# Patient Record
Sex: MALE | Race: WHITE | NOT HISPANIC OR LATINO | Employment: UNEMPLOYED | URBAN - METROPOLITAN AREA
[De-identification: names, ages, dates, MRNs, and addresses within clinical notes are randomized per-mention and may not be internally consistent; named-entity substitution may affect disease eponyms.]

---

## 2017-06-27 ENCOUNTER — HOSPITAL ENCOUNTER (EMERGENCY)
Facility: HOSPITAL | Age: 52
Discharge: HOME | End: 2017-06-27
Attending: EMERGENCY MEDICINE
Payer: MEDICAID

## 2017-06-27 VITALS
DIASTOLIC BLOOD PRESSURE: 59 MMHG | OXYGEN SATURATION: 96 % | HEIGHT: 65 IN | WEIGHT: 168 LBS | RESPIRATION RATE: 16 BRPM | SYSTOLIC BLOOD PRESSURE: 119 MMHG | BODY MASS INDEX: 27.99 KG/M2 | TEMPERATURE: 98 F | HEART RATE: 88 BPM

## 2017-06-27 DIAGNOSIS — M70.21 OLECRANON BURSITIS OF RIGHT ELBOW: Primary | ICD-10-CM

## 2017-06-27 LAB
APPEARANCE FLD: NORMAL
BODY FLD TYPE: NORMAL
COLOR FLD: YELLOW
LYMPHOCYTES NFR FLD MANUAL: 2 %
MONOS+MACROS NFR FLD MANUAL: 8 %
NEUTROPHILS NFR FLD MANUAL: 90 %
WBC # FLD: 1247 /CU MM

## 2017-06-27 PROCEDURE — 99283 EMERGENCY DEPT VISIT LOW MDM: CPT | Mod: 25

## 2017-06-27 PROCEDURE — 87070 CULTURE OTHR SPECIMN AEROBIC: CPT

## 2017-06-27 PROCEDURE — 20605 DRAIN/INJ JOINT/BURSA W/O US: CPT | Mod: RT

## 2017-06-27 PROCEDURE — 89051 BODY FLUID CELL COUNT: CPT

## 2017-06-27 RX ORDER — LIDOCAINE HYDROCHLORIDE 10 MG/ML
10 INJECTION INFILTRATION; PERINEURAL
Status: DISCONTINUED | OUTPATIENT
Start: 2017-06-27 | End: 2017-06-27 | Stop reason: HOSPADM

## 2017-06-27 RX ORDER — LISINOPRIL 10 MG/1
10 TABLET ORAL DAILY
COMMUNITY

## 2017-06-27 NOTE — ED PROVIDER NOTES
Encounter Date: 6/27/2017       History     Chief Complaint   Patient presents with    Joint Swelling     x1.5 weeks.      Ronald Clements is a 51 y.o. Male presenting for evaluation of right elbow swelling, persisting for the last few weeks.  No direct injury, trauma or fall.  No fever, no chills.  He has noticed no drainage or bleeding from the elbow.  No numbness, tingling or weakness in the right arm.  He has taken Aspirin with minimal relief.  He has never had this problem before.  He was evaluated at his PCP and they recommended he come here to have the elbow drained.        The history is provided by the patient.     Review of patient's allergies indicates:  No Known Allergies  Past Medical History:   Diagnosis Date    Arthritis     Asthma     Concussion, unspecified     Head injury    COPD (chronic obstructive pulmonary disease)     Hyperlipidemia     Hypertension      Past Surgical History:   Procedure Laterality Date    APPENDECTOMY      HERNIA REPAIR      left forearm surgery       s/p work injury    TONSILLECTOMY       Family History   Problem Relation Age of Onset    Kidney failure Mother      on dialysis    Diabetes Mother      borderline    Arthritis Father      Social History   Substance Use Topics    Smoking status: Never Smoker    Smokeless tobacco: Never Used    Alcohol use No     Review of Systems   Constitutional: Negative for chills and fever.   Musculoskeletal: Positive for arthralgias, joint swelling and myalgias. Negative for back pain, neck pain and neck stiffness.   Skin: Negative for color change, pallor, rash and wound.   Neurological: Negative for weakness and numbness.   Hematological: Does not bruise/bleed easily.       Physical Exam     Initial Vitals [06/27/17 0924]   BP Pulse Resp Temp SpO2   (!) 119/59 88 16 97.9 °F (36.6 °C) 96 %      MAP       79         Physical Exam    Nursing note and vitals reviewed.  Constitutional: He appears well-developed and  well-nourished. He is not diaphoretic. No distress.   Cardiovascular: Intact distal pulses.   Musculoskeletal: Normal range of motion. He exhibits tenderness. He exhibits no edema.        Right elbow: He exhibits swelling and effusion. He exhibits normal range of motion, no deformity and no laceration. Tenderness found. Olecranon process tenderness noted.        Arms:  Moderate swelling and mild tenderness noted to right olecranon process.  No erythema.  Minimal warmth.  No decreased ROM, decreased strength or loss of sensation to RUE.  Palpable 2+ radial pulse.  No active bleeding or discharge noted from elbow.     Neurological: He is alert and oriented to person, place, and time. He has normal strength. No sensory deficit.   Skin: Skin is warm and dry. No rash and no abscess noted. No erythema.         ED Course   Arthrocentesis  Date/Time: 6/27/2017 3:20 PM  Performed by: VJ GONZALEZ  Authorized by: JONATHAN MATHUR   Indications: joint swelling   Body area: elbow  Joint: right elbow  Local anesthesia used: yes  Anesthesia: local infiltration    Anesthesia:  Local anesthesia used: yes  Local Anesthetic: lidocaine 1% without epinephrine  Anesthetic total: 1 mL  Patient sedated: no  Preparation: Patient was prepped and draped in the usual sterile fashion.  Needle size: 18 G  Approach: posterior  Aspirate amount: 4 mL  Aspirate: serous  Patient tolerance: Patient tolerated the procedure well with no immediate complications        Labs Reviewed   CULTURE, AEROBIC  (SPECIFY SOURCE)   WBC & DIFF,BODY FLUID             Medical Decision Making:   Differential Diagnosis:   Bursitis  Septic joint         APC / Resident Notes:   His symptoms are most consistent with olecranon bursitis.  Culture, WBCs and cell count will be sent, but low suspicion for infectious etiology.  He will be discharged home to follow-up with orthopedics for re-evaluation and further treatment options.  He voices understanding and is  agreeable to the plan.  He is given specific return precautions.               ED Course     Clinical Impression:   The encounter diagnosis was Olecranon bursitis of right elbow.                           Sheri Culver PA-C  06/27/17 1528

## 2017-06-30 LAB — BACTERIA SPEC AEROBE CULT: NO GROWTH

## 2019-07-20 ENCOUNTER — HOSPITAL ENCOUNTER (EMERGENCY)
Facility: HOSPITAL | Age: 54
Discharge: HOME | End: 2019-07-20
Attending: EMERGENCY MEDICINE
Payer: MEDICARE

## 2019-07-20 VITALS
OXYGEN SATURATION: 98 % | WEIGHT: 185 LBS | TEMPERATURE: 98 F | RESPIRATION RATE: 18 BRPM | SYSTOLIC BLOOD PRESSURE: 125 MMHG | BODY MASS INDEX: 29.73 KG/M2 | HEIGHT: 66 IN | HEART RATE: 82 BPM | DIASTOLIC BLOOD PRESSURE: 84 MMHG

## 2019-07-20 DIAGNOSIS — S39.012A STRAIN OF LUMBAR PARASPINOUS MUSCLE, INITIAL ENCOUNTER: Primary | ICD-10-CM

## 2019-07-20 LAB
BILIRUB UR QL STRIP: NEGATIVE
CLARITY UR: CLEAR
COLOR UR: YELLOW
GLUCOSE UR QL STRIP: NEGATIVE
HGB UR QL STRIP: NEGATIVE
KETONES UR QL STRIP: NEGATIVE
LEUKOCYTE ESTERASE UR QL STRIP: NEGATIVE
NITRITE UR QL STRIP: NEGATIVE
PH UR STRIP: 7 [PH] (ref 5–8)
PROT UR QL STRIP: NEGATIVE
SP GR UR STRIP: 1.02 (ref 1–1.03)
URN SPEC COLLECT METH UR: NORMAL
UROBILINOGEN UR STRIP-ACNC: NEGATIVE EU/DL

## 2019-07-20 PROCEDURE — 63600175 PHARM REV CODE 636 W HCPCS: Performed by: NURSE PRACTITIONER

## 2019-07-20 PROCEDURE — 96372 THER/PROPH/DIAG INJ SC/IM: CPT

## 2019-07-20 PROCEDURE — 25000003 PHARM REV CODE 250: Performed by: NURSE PRACTITIONER

## 2019-07-20 PROCEDURE — 99284 EMERGENCY DEPT VISIT MOD MDM: CPT | Mod: 25

## 2019-07-20 PROCEDURE — 81003 URINALYSIS AUTO W/O SCOPE: CPT

## 2019-07-20 RX ORDER — DICLOFENAC SODIUM 75 MG/1
75 TABLET, DELAYED RELEASE ORAL 2 TIMES DAILY
Qty: 28 TABLET | Refills: 0 | Status: SHIPPED | OUTPATIENT
Start: 2019-07-20 | End: 2019-08-03

## 2019-07-20 RX ORDER — KETOROLAC TROMETHAMINE 30 MG/ML
30 INJECTION, SOLUTION INTRAMUSCULAR; INTRAVENOUS
Status: COMPLETED | OUTPATIENT
Start: 2019-07-20 | End: 2019-07-20

## 2019-07-20 RX ORDER — CYCLOBENZAPRINE HCL 10 MG
10 TABLET ORAL 3 TIMES DAILY PRN
Qty: 10 TABLET | Refills: 0 | Status: SHIPPED | OUTPATIENT
Start: 2019-07-20 | End: 2019-07-25

## 2019-07-20 RX ORDER — ACETAMINOPHEN 500 MG
1000 TABLET ORAL
Status: COMPLETED | OUTPATIENT
Start: 2019-07-20 | End: 2019-07-20

## 2019-07-20 RX ADMIN — KETOROLAC TROMETHAMINE 30 MG: 30 INJECTION, SOLUTION INTRAMUSCULAR at 10:07

## 2019-07-20 RX ADMIN — ACETAMINOPHEN 1000 MG: 500 TABLET ORAL at 10:07

## 2019-07-20 NOTE — ED NOTES
Pt reports no relief from toradol, requesting more pain medication, NP aware. No adverse reactions noted to medication(s) administered.

## 2019-07-20 NOTE — ED PROVIDER NOTES
Encounter Date: 7/20/2019    SCRIBE #1 NOTE: Palmira CASTELLON, chen scribing for, and in the presence of, NEWTON Calderon.       History     Chief Complaint   Patient presents with    Sciatica       Time seen by provider: 9:45 AM on 07/20/2019    Ronald Clements is a 53 y.o. male with PMHx of HTN, HLD, and COPD who presents to the ED with complaints of left sided back pain that started last night. The patient suspects kidney stones. He denies history of kidney stones and denies noticing recent urinary symptoms including blood in the urine. Denies recent injury or trauma. The patient endorses pain is constant and has gradually worsened since onset. Pain radiates down the entire left leg. The patient denies numbness, weakness, and loss of urine or bowel control. He has taken Ibuprofen without relief of pain. He denies abdominal pain and N/V/D. The patient is a nonsmoker. Denies alcohol consumption and drug use. The patient has no other medical concerns or complaints at this moment. He denies onset of any other new symptoms currently. SHx includes appendectomy and hernia repair. NKDA noted.     The history is provided by the patient.     Review of patient's allergies indicates:  No Known Allergies  Past Medical History:   Diagnosis Date    Arthritis     Asthma     Concussion, unspecified     Head injury    COPD (chronic obstructive pulmonary disease)     Hyperlipidemia     Hypertension      Past Surgical History:   Procedure Laterality Date    APPENDECTOMY      HERNIA REPAIR      left forearm surgery       s/p work injury    TONSILLECTOMY       Family History   Problem Relation Age of Onset    Kidney failure Mother         on dialysis    Diabetes Mother         borderline    Arthritis Father      Social History     Tobacco Use    Smoking status: Never Smoker    Smokeless tobacco: Never Used   Substance Use Topics    Alcohol use: No    Drug use: Yes     Types: Marijuana     Comment: in past-quit 10  years ago     Review of Systems   Constitutional: Negative for diaphoresis and fever.   Respiratory: Negative for cough.    Cardiovascular: Negative for chest pain.   Gastrointestinal: Negative for abdominal pain, diarrhea, nausea and vomiting.   Genitourinary: Negative for difficulty urinating, dysuria and hematuria.        - urinary incontinence  - loss of bowel movement control   Musculoskeletal: Positive for back pain (left sided). Negative for gait problem, joint swelling and myalgias.   Skin: Negative for pallor and rash.   Neurological: Negative for weakness and numbness.   Hematological: Does not bruise/bleed easily.   Psychiatric/Behavioral: Negative for confusion.       Physical Exam     Initial Vitals [07/20/19 0941]   BP Pulse Resp Temp SpO2   125/84 82 18 98.4 °F (36.9 °C) 98 %      MAP       --         Physical Exam    Nursing note and vitals reviewed.  Constitutional: He appears well-developed and well-nourished. He is not diaphoretic. He is active. No distress.   HENT:   Head: Normocephalic and atraumatic.   Right Ear: External ear normal.   Left Ear: External ear normal.   Nose: Nose normal.   Mouth/Throat: Oropharynx is clear and moist. No oropharyngeal exudate.   Eyes: Conjunctivae, EOM and lids are normal. Pupils are equal, round, and reactive to light. Right eye exhibits no chemosis and no discharge. Left eye exhibits no chemosis and no discharge. Right conjunctiva is not injected. Left conjunctiva is not injected.   Neck: Trachea normal and normal range of motion. Neck supple. No stridor present. No neck rigidity.   Cardiovascular: Normal rate, regular rhythm, normal heart sounds and normal pulses. Exam reveals no distant heart sounds and no friction rub.    No murmur heard.  Pulmonary/Chest: Breath sounds normal. No stridor. He has no wheezes. He has no rhonchi. He has no rales.   Abdominal: Soft. There is no tenderness.   No palpable abdominal tenderness noted.     Musculoskeletal: Normal  range of motion. He exhibits tenderness.        Lumbar back: He exhibits tenderness.   Left lower lumbar paraspinal tenderness. 5/5 strength and sensation to light touch to BUE. Pain with SLR on left.    Neurological: He is alert and oriented to person, place, and time. He has normal strength. No sensory deficit.   Skin: Skin is warm, dry and intact. Capillary refill takes less than 2 seconds. No rash and no abscess noted. No erythema. No pallor.   Psychiatric: He has a normal mood and affect. His speech is normal and behavior is normal. Thought content normal.         ED Course   Procedures  Labs Reviewed   URINALYSIS, REFLEX TO URINE CULTURE    Narrative:     Preferred Collection Type->Urine, Clean Catch          Imaging Results    None          Medical Decision Making:   History:   Old Medical Records: I decided to obtain old medical records.  Differential Diagnosis:   Cauda equina  Spinal abscess  Renal stone  strain  Clinical Tests:   Lab Tests: Ordered and Reviewed       APC / Resident Notes:   The patient's back pain is likely a musculoskeletal strain.  There are no signs of saddle anesthesia, incontinence, neurologic deficits, fevers, trauma or midline tenderness on history or physical to suggest cauda equina, infectious process, fracture or subluxation.  I will treat with anti-inflammatories and muscle relaxers for relief. U/A was negative. I do not feel further evaluation or treatment is needed and pt is stable for discharge. I have discussed pt with Dr Osorio who agrees with poc. Pt voices understanding and is agreeable to the plan.  He is given specific return precautions.            Scribe Attestation:   Scribe #1: I performed the above scribed service and the documentation accurately describes the services I performed. I attest to the accuracy of the note.      I, NEWTON Calderon, personally performed the services described in this documentation. All medical record entries made by the scribe were at  my direction and in my presence.  I have reviewed the chart and agree that the record reflects my personal performance and is accurate and complete. NEWTON Calderon.  11:12 AM 07/20/2019               Clinical Impression:       ICD-10-CM ICD-9-CM   1. Strain of lumbar paraspinous muscle, initial encounter S39.012A 847.2         Disposition:   Disposition: Discharged  Condition: Stable                        Ely Wooten NP  07/20/19 1133